# Patient Record
Sex: FEMALE | Race: WHITE | URBAN - METROPOLITAN AREA
[De-identification: names, ages, dates, MRNs, and addresses within clinical notes are randomized per-mention and may not be internally consistent; named-entity substitution may affect disease eponyms.]

---

## 2024-04-18 NOTE — PROGRESS NOTES
NEW PATIENT PROGRESS NOTE  OTOLOGY/OTOLARYNGOLOGY    REF MD:  No referring provider defined for this encounter.     PCP: JOVANY FAIRBANKS MD    CHIEF COMPLAINT:    Chief Complaint   Patient presents with    Consult     Pt was previously seen by Dr. Radford in [facility] for Dizziness issues, coming in today for lightheadedness.     LAST VISIT AT [facility] 6/29/2024  IMPRESSION:  Cervcalgia/neck stiffness  Vestibular migraine  Cervicogenic dizziness  History of migraine headaches     PLAN:  -Continue to work on neck stiffness/cervicalgia with ROM and manual therapy  -Lifestyle changes including stress reduction, migraine diet (caffeine cessation), sleep hygiene, hydration, regular meals discussed  -Follow-up as needed   ____________________________________________________________________________  HISTORY OF PRESENT ILLNESS: Marce Morataya is a 53 year old female who presents for evaluation of lightheadedness. Reports only one episode since last visit at [facility]. Reports occasional neck stiffness; attends chiropractic sessions and performs self-massage. Patient reports recent episode on 4/15 of dizziness and disorientation while opening fridge, lasting approximately one hour; felt off-balance and needed to sit down. Her episode resolved with laying down. She ceased regular alcohol consumption 2 years ago due to potential exacerbation of symptoms. Only occasionally has a drink but mostly avoids it.  Started menopause 2 years ago, however she just recently started experiencing hot flashes during the nighttime which is effecting her quality of sleep. Denies palpitations.     HPI and last Interval Hx Per [facility] note:  INTERVAL HX: Patient feels much better after PT for cervicalgia. She is able to connect worsening symptoms or flairs of dizziness with worsened neck pain.     HISTORY OF PRESENT ILLNESS: Marce Morataya is a 51y female who presents for evaluation of vertigo. It started in 08/2021. Had a 10 second episode of vertigo that  was then followed by some generalized dizziness and imbalance for about an hour. The generalized dizziness has now happened 3 times in the last month and it lasts a couple hours. Patient does not really get a headache very often - unless her \"sinuses\" are feel bad or if she has stress from work. Denies personal history of migraine headache. Denies hearing loss, tinnitus. Had a normal hearing test in 01/2021 - through Dr. Mena. Denies jaw pain. Has neck/shoulder issues from desk work. Drinks 12-14oz of coffee daily. Patient notes she has some stress related to her work. Patient stays well hydrated.    Patient has sinus pressure. Has PND. Dr. Mena recommended BID flonase and allegra D. This has help with PND. Allergy panel was done recently - positive for mold, ragweed, cockroach.     Denies vertigo, dizziness, tinnitus, aural fullness, hearing loss, hearing fluctuation, otorrhea, otalgia or previous otologic procedure.    PAST MEDICAL HISTORY:  History reviewed. No pertinent past medical history.    PAST SURGICAL HISTORY:  History reviewed. No pertinent surgical history.    No current outpatient medications on file prior to visit.     No current facility-administered medications on file prior to visit.       Allergies: Not on File    SOCIAL HISTORY:    Social History     Tobacco Use    Smoking status: Never    Smokeless tobacco: Never   Substance Use Topics    Alcohol use: Not Currently       FAMILY HISTORY: Denies known family history of hearing loss, tinnitus, vertigo, or migraine.  Denies known family history of head and neck cancer, thyroid cancer, bleeding disorders.     REVIEW OF SYSTEMS:   Positives are in bold  Neuro: Headache, facial weakness, facial numbness, neck pain, vertigo  ENT: Hearing change, tinnitus, otorrhea, otalgia, aural fullness, ear pressure, vertigo, imbalance  Sinus pressure, rhinorrhea, congestion, facial pain, jaw pain, dysphagia, odynophagia, sore throat, voice changes, shortness of  breath    EXAMINATION:  I washed my hands with an alcohol-based hand gel prior to examination  Constitutional:   --Vitals: Blood pressure 127/77, height 5' 4\" (1.626 m), weight 169 lb 6.4 oz (76.8 kg).  --General: no apparent distress, well-developed, conversant  Psych: affect pleasant and appropriate for age, alert and oriented  Neuro: Facial movement normal bilateral  Eyes: Pupils equal, symmetric and reactive to light.  Extra-ocular muscles intact  Respiratory: No stridor, stertor or increased work of breathing  ENT:  --Ear: The bilateral ears were examined under binocular microscopy  Right ear microscopic exam:  Pinna: Normal, no lesions or masses.  Mastoid: Nontender on palpation.   External auditory canal: Clear, no masses or lesions.  Tympanic membrane: Intact, no lesions, normal landmarks.  Middle ear: Aerated.    Left ear microscopic exam:  Pinna: Normal, no lesions or masses.  Mastoid: Nontender on palpation.   External auditory canal: Clear, no masses or lesions.  Tympanic membrane: Intact, no lesions, normal landmarks.  Middle ear: Aerated.    Vestibular examination (03/28/22): Performed with infrared frenzel goggles  No neutral gaze nystagmus  No headshake nystagmus  Hallpike right: negative, Hallpike left: negative  No aversion to OPK stimuli     ASSESSMENT/PLAN:  Marce Morataya is a 53 year old female with     ICD-10-CM   1. Cervicalgia  M54.2   2. Vestibular migraine  G43.809        IMPRESSION:  Cervcalgia/neck stiffness  Vestibular migraine  Cervicogenic dizziness  History of migraine headaches     PLAN:  -Continue to work on neck stiffness/cervicalgia with ROM and manual therapy  -Lifestyle changes including stress reduction, migraine diet (caffeine cessation), sleep hygiene, hydration, regular meals discussed  -Consider follow up with OB/GYN to discuss post-menopausal symptom management if patient finds symptoms to be affecting quality of sleep  -Follow-up as needed     Situation reviewed with the  patient in detail.    Attention: This note has been scribed by Alla Reyes under the supervision of Jamil Chung MD.     Jamil Chung MD  Otology/Otolaryngology  [address] [place] [id number]  Phone [phone]  Fax [phone]      I have personally performed the services described in this documentation. All medical record entries made by the scribe were at my direction and in my presence. I have reviewed the chart and agree that the medical record reflects my personal performance and is accurate and complete.

## 2024-04-19 ENCOUNTER — OFFICE VISIT (OUTPATIENT)
Dept: OTOLARYNGOLOGY | Facility: CLINIC | Age: 54
End: 2024-04-19
Payer: COMMERCIAL

## 2024-04-19 VITALS
WEIGHT: 169.38 LBS | BODY MASS INDEX: 28.92 KG/M2 | HEIGHT: 64 IN | DIASTOLIC BLOOD PRESSURE: 77 MMHG | SYSTOLIC BLOOD PRESSURE: 127 MMHG

## 2024-04-19 DIAGNOSIS — G43.809 VESTIBULAR MIGRAINE: ICD-10-CM

## 2024-04-19 DIAGNOSIS — M54.2 CERVICALGIA: Primary | ICD-10-CM
